# Patient Record
Sex: MALE | Race: WHITE
[De-identification: names, ages, dates, MRNs, and addresses within clinical notes are randomized per-mention and may not be internally consistent; named-entity substitution may affect disease eponyms.]

---

## 2020-04-16 ENCOUNTER — HOSPITAL ENCOUNTER (OUTPATIENT)
Dept: HOSPITAL 92 - ERS | Age: 70
Setting detail: OBSERVATION
LOS: 2 days | Discharge: HOME | End: 2020-04-18
Attending: FAMILY MEDICINE | Admitting: FAMILY MEDICINE
Payer: MEDICARE

## 2020-04-16 ENCOUNTER — HOSPITAL ENCOUNTER (EMERGENCY)
Dept: HOSPITAL 18 - NAV ERS | Age: 70
Discharge: ANOTHER HOSPITAL | End: 2020-04-16
Payer: MEDICARE

## 2020-04-16 VITALS — BODY MASS INDEX: 20.1 KG/M2

## 2020-04-16 DIAGNOSIS — D64.9: ICD-10-CM

## 2020-04-16 DIAGNOSIS — C34.11: Primary | ICD-10-CM

## 2020-04-16 DIAGNOSIS — R91.8: ICD-10-CM

## 2020-04-16 DIAGNOSIS — B19.20: ICD-10-CM

## 2020-04-16 DIAGNOSIS — B19.10: ICD-10-CM

## 2020-04-16 DIAGNOSIS — E83.52: ICD-10-CM

## 2020-04-16 DIAGNOSIS — J98.59: Primary | ICD-10-CM

## 2020-04-16 DIAGNOSIS — Z87.891: ICD-10-CM

## 2020-04-16 LAB
ALBUMIN SERPL BCG-MCNC: 4.1 G/DL (ref 3.4–4.8)
ALP SERPL-CCNC: 109 U/L (ref 40–110)
ALT SERPL W P-5'-P-CCNC: 12 U/L (ref 8–55)
ANION GAP SERPL CALC-SCNC: 14 MMOL/L (ref 10–20)
AST SERPL-CCNC: 12 U/L (ref 5–34)
BILIRUB SERPL-MCNC: 0.4 MG/DL (ref 0.2–1.2)
BUN SERPL-MCNC: 12 MG/DL (ref 8.4–25.7)
CALCIUM SERPL-MCNC: 11.6 MG/DL (ref 7.8–10.44)
CHLORIDE SERPL-SCNC: 100 MMOL/L (ref 98–107)
CO2 SERPL-SCNC: 27 MMOL/L (ref 23–31)
CREAT CL PREDICTED SERPL C-G-VRATE: 0 ML/MIN (ref 70–130)
GLOBULIN SER CALC-MCNC: 4 G/DL (ref 2.4–3.5)
GLUCOSE SERPL-MCNC: 123 MG/DL (ref 80–115)
HGB BLD-MCNC: 11.9 G/DL (ref 14–18)
MCH RBC QN AUTO: 26.1 PG (ref 27–31)
MCV RBC AUTO: 79.7 FL (ref 78–98)
MDIFF COMPLETE?: YES
PLATELET # BLD AUTO: 757 THOU/UL (ref 130–400)
POTASSIUM SERPL-SCNC: 4 MMOL/L (ref 3.5–5.1)
RBC # BLD AUTO: 4.57 MILL/UL (ref 4.7–6.1)
SODIUM SERPL-SCNC: 137 MMOL/L (ref 136–145)
WBC # BLD AUTO: 29.8 THOU/UL (ref 4.8–10.8)

## 2020-04-16 PROCEDURE — 96375 TX/PRO/DX INJ NEW DRUG ADDON: CPT

## 2020-04-16 PROCEDURE — 85027 COMPLETE CBC AUTOMATED: CPT

## 2020-04-16 PROCEDURE — 88305 TISSUE EXAM BY PATHOLOGIST: CPT

## 2020-04-16 PROCEDURE — 36415 COLL VENOUS BLD VENIPUNCTURE: CPT

## 2020-04-16 PROCEDURE — 77012 CT SCAN FOR NEEDLE BIOPSY: CPT

## 2020-04-16 PROCEDURE — A9579 GAD-BASE MR CONTRAST NOS,1ML: HCPCS

## 2020-04-16 PROCEDURE — 96361 HYDRATE IV INFUSION ADD-ON: CPT

## 2020-04-16 PROCEDURE — G0378 HOSPITAL OBSERVATION PER HR: HCPCS

## 2020-04-16 PROCEDURE — 85610 PROTHROMBIN TIME: CPT

## 2020-04-16 PROCEDURE — 0BBC3ZX EXCISION OF RIGHT UPPER LUNG LOBE, PERCUTANEOUS APPROACH, DIAGNOSTIC: ICD-10-PCS | Performed by: RADIOLOGY

## 2020-04-16 PROCEDURE — 80053 COMPREHEN METABOLIC PANEL: CPT

## 2020-04-16 PROCEDURE — 88342 IMHCHEM/IMCYTCHM 1ST ANTB: CPT

## 2020-04-16 PROCEDURE — 96376 TX/PRO/DX INJ SAME DRUG ADON: CPT

## 2020-04-16 PROCEDURE — 78306 BONE IMAGING WHOLE BODY: CPT

## 2020-04-16 PROCEDURE — 99284 EMERGENCY DEPT VISIT MOD MDM: CPT

## 2020-04-16 PROCEDURE — 80048 BASIC METABOLIC PNL TOTAL CA: CPT

## 2020-04-16 PROCEDURE — 71046 X-RAY EXAM CHEST 2 VIEWS: CPT

## 2020-04-16 PROCEDURE — 85025 COMPLETE CBC W/AUTO DIFF WBC: CPT

## 2020-04-16 PROCEDURE — 87040 BLOOD CULTURE FOR BACTERIA: CPT

## 2020-04-16 PROCEDURE — 96374 THER/PROPH/DIAG INJ IV PUSH: CPT

## 2020-04-16 PROCEDURE — 99285 EMERGENCY DEPT VISIT HI MDM: CPT

## 2020-04-16 PROCEDURE — 70553 MRI BRAIN STEM W/O & W/DYE: CPT

## 2020-04-16 PROCEDURE — 32405: CPT

## 2020-04-16 PROCEDURE — 73030 X-RAY EXAM OF SHOULDER: CPT

## 2020-04-16 PROCEDURE — 71045 X-RAY EXAM CHEST 1 VIEW: CPT

## 2020-04-16 PROCEDURE — 71270 CT THORAX DX C-/C+: CPT

## 2020-04-16 PROCEDURE — 83605 ASSAY OF LACTIC ACID: CPT

## 2020-04-16 PROCEDURE — 88333 PATH CONSLTJ SURG CYTO XM 1: CPT

## 2020-04-16 PROCEDURE — A9503 TC99M MEDRONATE: HCPCS

## 2020-04-16 PROCEDURE — 85007 BL SMEAR W/DIFF WBC COUNT: CPT

## 2020-04-16 PROCEDURE — 85730 THROMBOPLASTIN TIME PARTIAL: CPT

## 2020-04-16 PROCEDURE — 88341 IMHCHEM/IMCYTCHM EA ADD ANTB: CPT

## 2020-04-16 PROCEDURE — 74178 CT ABD&PLV WO CNTR FLWD CNTR: CPT

## 2020-04-16 PROCEDURE — 88334 PATH CONSLTJ SURG CYTO XM EA: CPT

## 2020-04-16 RX ADMIN — HYDROCODONE BITARTRATE AND ACETAMINOPHEN PRN TAB: 5; 325 TABLET ORAL at 22:40

## 2020-04-16 RX ADMIN — HYDROCODONE BITARTRATE AND ACETAMINOPHEN PRN TAB: 5; 325 TABLET ORAL at 18:47

## 2020-04-16 NOTE — HP
PRIMARY CARE PROVIDER:  [facility].



CHIEF COMPLAINT:  Chest and right upper extremity pain.



HISTORY OF PRESENT ILLNESS:  This is a 69-year-old  male, who presents to

[facility] Emergency Department complaining of progressive and severe right

shoulder, upper and chest wall pain.  The patient states he noticed pain over the

last several weeks, increasing in intensity and severity, not amenable to

over-the-counter medications.  The patient states he took an old prescription of

Norco which was prescribed to him after a hernia repair in 2019.  The patient had

some relief with Norco, but was unable to sleep lying on his back and had to sit

upright due to the pain.  The patient denied any direct trauma injury, fall, or

swelling.  The patient states he works as a  and is normally very active and

thought he might have pulled a muscle while working.  The patient admits to

unintended weight loss of 20 to 30 pounds since the late fall of 2019.  The patient

states he noticed the weight loss with associated shortness of breath, but no

hematuria, hemoptysis, or melena.  The patient denied any documented fever, chills,

or family members with similar symptoms.  The patient denied any specific travel

history or exposures.  The patient noted burning sensation in the right upper chest

and upper back, worse when lying flat or taking deep breath.  In the emergency room,

the patient underwent plain radiographs of the shoulder as well as CT of the chest

showing large tumors at the upper right chest wall, right hilar region and right

adrenal.  The patient received IV Toradol and morphine sulfate and was referred to

the Hospitalist Service for further evaluation. 



PAST MEDICAL HISTORY:  

1. Chronic hepatitis B and C without current treatment.

2. History of basal cell carcinoma.



PAST SURGICAL HISTORY:  

1. Status post inguinal hernia repair in 2019.

2. Status post multiple skin cancer removals.



CURRENT MEDICATIONS:  Norco 5/325 mg 1 to 2 tablets p.o. q.6 hours p.r.n. pain.



ALLERGIES:  NO KNOWN DRUG ALLERGIES.



FAMILY HISTORY:  Mother with history of inoperable brain cancer of unknown type.

Father with history of pancreatic cancer.  The patient's son with history of gastric

cancer. 



SOCIAL HISTORY:  The patient resides in [place], [place].  Works as a .

.  No current alcohol, tobacco, or illicit drug use.  Quit smoking in the

1970s after smoking for approximately 20 years. 



REVIEW OF SYSTEMS:  CONSTITUTIONAL:  Negative for weight loss or gain, ability to

conduct usual activities. 

SKIN:  Negative for rash, itching. 

EYES:  Negative for double vision, pain. 

ENT/MOUTH:  Negative for nose bleeding, neck stiffness, pain, tenderness. 

CARDIOVASCULAR:  Negative for palpitations, dyspnea on exertion, orthopnea. 

RESPIRATORY:  Negative for shortness of breath, wheezing, cough, hemoptysis, fever

or night sweats. 

GASTROINTESTINAL:  Negative for poor appetite, abdominal pain, heartburn, nausea,

vomiting, constipation, or diarrhea. 

GENITOURINARY:  Negative for urgency, frequency, dysuria, nocturia. 

MUSCULOSKELETAL:  Negative for pain, swelling. 

NEUROLOGIC/PSYCHIATRIC:  Negative for anxiety, depression. 

ALLERGY/IMMUNOLOGIC:  Negative for skin rash, bleeding tendency. 



Otherwise negative except as stated per HPI.



PHYSICAL EXAMINATION:

VITAL SIGNS:  On admission, blood pressure 158/98, pulse 82, respiratory rate 16,

temperature 98.2 degrees Fahrenheit, and O2 saturation 98% on room air. 

GENERAL APPEARANCE:  This is a 69-year-old  male, alert and oriented x3,

pleasant, responsive, in no acute distress. 

HEENT:  Pupils are equal, round, reactive to light and accommodation.  Extraocular

muscles are intact.  No scleral icterus.  No conjunctival injection.  Nares are

patent.  OP is clear.  Teeth in good repair. 

NECK:  Supple.  No cervical adenopathy.  No thyromegaly.  No carotid bruits.  No JVD

appreciated.  Cervical spine with full active and passive range of motion.  No

meningeal signs noted. 

CHEST:  Coarse breath sounds with diminished air flow in the right base and right

upper lobe.  Occasional expiratory wheeze in the right hemithorax. 

CARDIOVASCULAR:  S1, S2 without noted murmur, rub, or gallop. 

ABDOMEN:  Rounded, soft, nontender, and nondistended.  Bowel sounds are positive in

all 4 quadrants.  There is no hepatosplenomegaly.  No abdominal bruits.  No rebound

or guarding appreciated. 

EXTREMITIES:  Warm and dry with fair turgor.  No clubbing, cyanosis, or asymmetric

edema appreciated.  Pulses palpable distally at the dorsalis pedis, posterior

tibial, and popliteal arteries bilaterally.  Capillary refill less than 2 seconds. 

NEUROLOGIC:  Cranial nerves 2 through 12 are grossly intact.  No focal or

lateralizing signs appreciated. 

MUSCULOSKELETAL:  Positive tenderness to palpation in the right upper chest wall

anteriorly. 



PERTINENT LABORATORY AND X-RAY FINDINGS:  Sodium 137, potassium 4.0, chloride 100,

CO2 of 27, BUN 12, creatinine 0.88, estimated GFR of 86, glucose 123, lactic acid

level 0.9, calcium 11.6.  LFTs within normal limits.  Albumin 4.1.  CBC showed a

white blood cell count of 29.8, hemoglobin 12, hematocrit 36, platelet count 757

with 74% neutrophils.  Three views of the right shoulder dated 04/16/2020 showed

pleural-based large right upper lobe pulmonary mass with possible cavitation and

adjacent lesion of the right mediastinum.  No fracture of the shoulder.  CT of the

chest dated 04/16/2020 showed stage IV malignant neoplasm of the upper lobe of the

right lung with large right upper lobe lateral pleural-based necrotic mass with

direct invasion of the right second rib and right chest wall musculature.  Right

hilar malignant neoplastic tumor noted.  Right adrenal necrotic mass. 



ASSESSMENT AND PLAN:  

1. Multiple lung neoplasms.  Suspect stage IV lung carcinoma, given CT imaging.  We

will obtain CT-guided biopsy to define the pathology and for planning of treatment

options.  Consult Medical Oncology Service.  Symptomatic management with Toradol 30

mg IV q.6 hours and morphine sulfate 4 mg IV q.4 hours p.r.n.  Add DuoNeb q.4 hours. 

2. Chest wall pain.  Secondarily to #1.  Continue symptomatic and supportive

management as outlined previously. 

3. Dyspnea.  Secondarily to #1.  We will provide oxygen p.r.n. to maintain O2

saturations greater than or equal to 90%.  Bronchodilator therapy with DuoNeb. 

4. Hypercalcemia.  Secondarily to #1.  We will continue intravenous normal saline at

100 mL/h and repeat calcium level in the a.m. 

5. Normocytic anemia.  Suspect secondarily to #1.  No evidence of acute blood loss.

Serial hemoglobin and hematocrit monitoring. 

6. Prophylaxis.  SCDs while in bed.  Pepcid 20 mg p.o. b.i.d.

7. Code status is full.  Surrogate medical decision maker is the patient's spouse.







Job ID:  [id number]

## 2020-04-17 LAB
ANION GAP SERPL CALC-SCNC: 11 MMOL/L (ref 10–20)
APTT PPP: 33.8 SEC (ref 22.9–36.1)
BUN SERPL-MCNC: 15 MG/DL (ref 8.4–25.7)
CALCIUM SERPL-MCNC: 10.4 MG/DL (ref 7.8–10.44)
CHLORIDE SERPL-SCNC: 104 MMOL/L (ref 98–107)
CO2 SERPL-SCNC: 26 MMOL/L (ref 23–31)
CREAT CL PREDICTED SERPL C-G-VRATE: 78 ML/MIN (ref 70–130)
GLUCOSE SERPL-MCNC: 107 MG/DL (ref 80–115)
HGB BLD-MCNC: 10 G/DL (ref 14–18)
INR PPP: 1.1
MCH RBC QN AUTO: 26.6 PG (ref 27–31)
MCV RBC AUTO: 80.5 FL (ref 78–98)
MDIFF COMPLETE?: YES
PLATELET # BLD AUTO: 637 THOU/UL (ref 130–400)
POLYCHROMASIA BLD QL SMEAR: (no result) (100X)
POTASSIUM SERPL-SCNC: 3.9 MMOL/L (ref 3.5–5.1)
PROTHROMBIN TIME: 14.2 SEC (ref 12–14.7)
RBC # BLD AUTO: 3.77 MILL/UL (ref 4.7–6.1)
SODIUM SERPL-SCNC: 137 MMOL/L (ref 136–145)
WBC # BLD AUTO: 22.9 THOU/UL (ref 4.8–10.8)

## 2020-04-17 RX ADMIN — HYDROCODONE BITARTRATE AND ACETAMINOPHEN PRN TAB: 5; 325 TABLET ORAL at 03:03

## 2020-04-17 RX ADMIN — HYDROCODONE BITARTRATE AND ACETAMINOPHEN PRN TAB: 5; 325 TABLET ORAL at 16:12

## 2020-04-17 RX ADMIN — HYDROCODONE BITARTRATE AND ACETAMINOPHEN PRN TAB: 5; 325 TABLET ORAL at 20:18

## 2020-04-17 NOTE — NM
NUCLEAR MEDICINE WHOLE BODY SCAN:



HISTORY: 

Lung cancer. Weight loss. Staging study.



TECHNIQUE: 

Patient was administered 28.90 mCi of technetium 99m MDP intravenously. Whole body delayed imaging is
 performed.



FINDINGS:

Physiologic distribution of radiotracer.



Uptake in bilateral shoulders, bilateral wrists, bilateral knees and the right first metatarsal phala
ngeal joint on the basis of degenerative change.



There is focal radiotracer involving the anterior lateral right second rib. Uptake corresponds to kassandra
nges noted on chest CT (4/16/2018). There is bone destruction compatible with metastatic disease.

Additional metastatic lesions in the right ribs are not noted.



Uptake in the distal lumbar spine is felt to be on the basis of degenerative change given vacuum disc
 phenomenon and osteophyte formation at L4-L5 and L5-S1.



IMPRESSION:

Solitary metastatic focus involving the anterior lateral right second rib.



Transcribed Date/Time: 4/17/2020 1:48 PM



Reported By: Shara Villalobos 

Electronically Signed:  4/17/2020 2:12 PM

## 2020-04-17 NOTE — PDOC.HOSPP
- Subjective


Encounter Date: 04/17/20


Encounter Time: 16:00


Subjective: 





f/u for suspected metastatic lung carcinoma with involvement of R adrenal s/p CT

-guided bx today. Feels ok overall and awaiting MRI brain for staging purposes.





- Objective


Vital Signs & Weight: 


 Vital Signs (12 hours)











  Temp Pulse Resp BP Pulse Ox


 


 04/17/20 12:09  98 F  87  18  144/71 H  99


 


 04/17/20 08:29  97.5 F L  74  18  117/67  98








 Weight











Admit Weight                   148 lb 4.8 oz


 


Weight                         148 lb 4.8 oz














I&O: 


 











 04/16/20 04/17/20 04/18/20





 06:59 06:59 06:59


 


Intake Total  2073 120


 


Balance  2073 120











Result Diagrams: 


 04/17/20 07:08





 04/17/20 07:08


Radiology Reviewed by me: Yes (CT abd/pel - Large R adrenal mass)





Hospitalist ROS





- Medication


Medications: 


Active Medications











Generic Name Dose Route Start Last Admin





  Trade Name Freq  PRN Reason Stop Dose Admin


 


Hydrocodone Bitart/Acetaminophen  1 tab  04/16/20 17:42  04/16/20 18:47





  Norco 5/325  PO   1 tab





  Q4H PRN   Administration





  Moderate Pain (4-6)   





     





     





     


 


Hydrocodone Bitart/Acetaminophen  2 tab  04/16/20 17:42  04/17/20 03:03





  [place] 5/325  PO   2 tab





  Q4H PRN   Administration





  Severe Pain (7-10)   





     





     





     


 


Famotidine  20 mg  04/16/20 21:00  04/17/20 08:31





  Pepcid  PO   20 mg





  BID MICHAEL   Administration





     





     





     





     


 


Sodium Chloride  1,000 mls @ 100 mls/hr  04/16/20 17:45  04/17/20 04:58





  Normal Saline 0.9%  IV   1,000 mls





  .Q10H MICHAEL   Administration





     





     





     





     


 


Ketorolac Tromethamine  30 mg  04/16/20 18:00  04/17/20 11:22





  Toradol  IVP  04/21/20 18:01  30 mg





  Q6HR MICHAEL   Administration





     





     





     





     


 


Morphine Sulfate  4 mg  04/16/20 17:42  04/17/20 08:34





  Morphine  SLOW IVP   4 mg





  Q4H PRN   Administration





  Moderate to Severe Pain (6-10)   





     





     





     














- Exam


General Appearance: NAD, awake alert


Eye: PERRL, anicteric sclera


ENT: normocephalic atraumatic, no oropharyngeal lesions


Neck: supple, symmetric, no JVD, no thyromegaly


Heart: RRR, no murmur, no gallops, no rubs, normal peripheral pulses


Heart - other findings: S1, S2


Respiratory: CTAB, no wheezes, no rales, no ronchi, normal chest expansion


Gastrointestinal: soft, non-tender, non-distended, normal bowel sounds, no 

palpable masses


Extremities: no cyanosis, no clubbing, no edema


Skin: normal turgor, no lesions


Neurological: cranial nerve grossly intact, no new deficit


Musculoskeletal: normal tone, normal strength


Psychiatric: normal affect, A&O x 3





Hosp A/P


(1) Metastatic lung carcinoma


Code(s): C78.00 - SECONDARY MALIGNANT NEOPLASM OF UNSPECIFIED LUNG   Status: 

Acute   


Qualifiers: 


   Laterality: right   Qualified Code(s): C78.01 - Secondary malignant neoplasm 

of right lung   


Plan: 


Suspected and awaiting pathological confirmation, workup pending with MRI brain 

in am, planning for outpt medical oncology f/u








(2) Chest wall pain


Code(s): R07.89 - OTHER CHEST PAIN   Status: Acute   


Plan: 


Secondary to #1, pain control








(3) Dyspnea


Code(s): R06.00 - DYSPNEA, UNSPECIFIED   Status: Acute   


Plan: 


Secondary to #1, no O2 requirement currently








(4) Hypercalcemia


Code(s): E83.52 - HYPERCALCEMIA   Status: Acute   


Plan: 


Improved with IVF's, continue IVF's another 24h then d/c








- Plan


, out of bed/ambulate, DVT proph w/SCDs





Stable currently


Continue IVF's


Pain control with Norco/Morphine Sulfate PRN


MRI brain in am


Outpt follow up with Med oncology


AM lab: CBC


Likely home in am

## 2020-04-17 NOTE — CON
DATE OF CONSULTATION:  



REASON FOR CONSULTATION:  Lung mass.



HISTORY OF PRESENT ILLNESS:  Mr. Angelo is a very pleasant 69-year-old gentleman who

presented to the emergency room in [place] with a several-week history of right

shoulder blade and chest wall pain.  He thought it was due to injury from working.

The pain got significantly worse, so he presented to the ER for evaluation.  He had

a chest x-ray, which showed multiple lung nodules and underwent a chest CT scan.  CT

scan showed a 6.5 x 6.5 x 4.5 mass in the right upper lobe.  There was another mass

in the right hilum abutting the right mediastinum, measuring 6 x 3.5 x 4.5.  It was

extrinsically compressing and narrowing the superior vena cava.  He also had a 5.5 x

3.5 x 6.5 right adrenal mass.  He was transferred to this facility for further

workup.  He underwent a CT of the abdomen and pelvis, which again confirmed the

large right adrenal mass.  Bone scan showed a lesion in the right rib.  He has been

treated with Toradol and Norco for pain.  He has had a lung biopsy this morning.

This patient states he smoked briefly, but quit 45 years ago.  He has lost 15 pounds

over the last six months.  Denies any shortness of breath, although he does have

difficulty lying flat due to pain in his right shoulder.  No abdominal discomfort or

swelling. 



PAST MEDICAL HISTORY:  

1. Chronic hepatitis B and C.

2. History of basal cell carcinoma.



PAST SURGICAL HISTORY:  

1. Skin cancer removals.

2. Inguinal hernia repair.



ALLERGIES:  NO KNOWN DRUG ALLERGIES.



HOME MEDICATIONS:  

1. Norco p.r.n.

2. Milk thistle for hepatitis.



FAMILY HISTORY:  Mother had brain surgery.  Son  from gastric cancer.  Father

had pancreatic cancer. 



SOCIAL HISTORY:  , lives with his wife in [place], works as a .

Remote history of smoking and alcohol use. 



REVIEW OF SYSTEMS:  A 10-point review of systems is negative except for noted in HPI.



PHYSICAL EXAMINATION:

VITAL SIGNS:  Temperature is 98, pulse is 87, respiratory rate 18, BP is 144/71, and

he is 99% on room air. 

GENERAL:  This is a well-developed, well-nourished male, in no acute distress. 

HEENT:  Normocephalic and atraumatic.  Pupils equal and reactive to light. 

NECK:  Supple. 

CV:  Regular rate and rhythm. 

LUNGS:  Clear anterior. 

ABDOMEN:  Soft and nontender.  Bowel sounds are positive. 

EXTREMITIES:  There is no clubbing or cyanosis. 

SKIN:  No rash. 

LYMPH:  There is no palpable cervical or supraclavicular lymphadenopathy. 

NEUROLOGIC:  Nonfocal.



PERTINENT LABORATORY DATA AND X-RAYS:  Current WBCs are 22.9, hemoglobin 10,

hematocrit 30.3, and platelet count is 637,000.  He has 67% neutrophils, 10% bands,

and 7% lymphocytes.  PT is 14.2, INR is 1.1, and PTT is 33.8.  Sodium is 137,

potassium 3.9, chloride 104, CO2 is 26, BUN is 15, creatinine is 0.87, and calcium

is 10.4.  Bilirubin is 0.4, AST is 12, ALT is 12, alkaline phosphatase is 109, serum

total protein is 8.1, albumin 4.1, and globulin 4.0.  Radiology per HPI. 



ASSESSMENT:  

1. Metastatic disease, likely lung primary.

2. Extrinsic compression of a lower superior vena cava from lung mass.



DISCUSSION:  Case has been discussed with Dr. Dhillon.  The patient's biopsy has been

completed and path is currently pending.  His pain has been controlled with [place]

and Toradol.  He does need a brain MRI to complete staging.  We will order that for

today or tomorrow.  Once he is stable, he can be discharged home.  We will discuss

further with Dr. Dhillon if he needs radiation to his lung mass for palliation. 



Thank you for the consult.  We will be happy to take care of this patient.







Job ID:  [id number]

## 2020-04-17 NOTE — RAD
INSPIRATORY AND EXPIRATORY CHEST:

 

Date:  04/17/2020

 

HISTORY:  

Post lung biopsy. 

 

COMPARISON:  

04/17/2020 at 1038 hours. 

 

FINDINGS:

Large cavitary pleural based right upper lobe mass is again seen with persistent irregular-shaped mas
s in the right hilar and suprahilar region. Bullous emphysematous changes at each lung apex are again
 present. Calcified granuloma left mid lung zone is present. Left lung otherwise appears clear. No pn
eumothorax or pleural effusion is identified. 

 

IMPRESSION: 

1.  Stable chest without evidence of a pneumothorax or pleural effusion. 

2.  Right upper lobe masses persist. 

 

 

POS: [facility]

## 2020-04-17 NOTE — CT
CT abdomen and pelvis without and with IV contrast



HISTORY: Lung cancer. Adrenal mass.



FINDINGS: Small hiatal hernia. Calcified granulomata are consistent with healed granulomatous disease
. A 1.8 cm oval cyst is present within the lower anterior margin of the medial segment right liver

lobe.



Calcification throughout the arterial structures.



Hyperdense material within the dependent portion of the gallbladder lumen has the appearance of hyper
dense fluid rather than biliary sludge. Possibly related to prior iodine ingestion.



A large, somewhat irregular shaped and lobulated very heterogeneous enhancing solid mass of the right
 adrenal gland is 7.4 cm greatest length by 7.1 cm depth by 3.4 cm with. Left adrenal gland has a

normal appearance.



Urinary bladder is decompressed. Diverticula arise from the colon without adjacent inflammation. Larg
e amount stool is present within the colon.



  



IMPRESSION :

Large aggressive right adrenal mass, consistent with a metastatic lesion.



Constipation. Diverticulosis. No evidence of diverticulitis.



Atherosclerosis.



Small hiatal hernia.



Reported By: DEL Brown 

Electronically Signed:  4/17/2020 11:27 AM

## 2020-04-17 NOTE — CT
PROCEDURE:

CT Lung Perc Biopsy



PROVIDED CLINICAL HISTORY:

Cavitary right upper lobe mass with extension into the adjacent pleura. Biopsy was requested



COMPARISON:

CT thorax on 4/16/2020.



TECHNIQUE:

The procedure including the risks and complications were explained to the patient, and informed conse
nt was obtained. The patient was placed on the CT scan table in the supine position. Limited

noncontrasted CT scan was obtained through the right upper lung zones with grid localizer in place ov
erlying the right anterolateral upper chest. An area overlying the pleural-based component of the

large cavitary right upper lobe mass was marked, and the area was meticulously prepped and draped in 
usual sterile fashion.



Skin and subcutaneous tissues were infiltrated with buffered 1% lidocaine for local anesthesia. A sma
ll skin incision was made. A 17-gauge guide needle was advanced to the level of the pleural-based

component of the large right upper lobe mass. Utilizing coaxial technique, three 18-gauge core needle
 biopsy specimens were obtained. However, only necrotic material was obtained on each biopsy. The

needle was removed, and hemostasis was achieved with direct pressure.



The skin and subcutaneous tissues just medial to the original biopsy site and overlying the right upp
er lobe mass were infiltrated with buffered 1% lidocaine for local anesthesia. A small skin

incision was made. A 17-gauge guide needle was advanced followed by axial noncontrasted CT images. Th
is was repeated until the needle was placed just within the right upper lobe mass. A total of three

18-gauge core needle biopsy specimens were obtained. Pathologist was available for evaluation of the 
specimen and noted adequate cellular material. As result, no additional biopsy specimens were

obtained.



The needle was removed, and hemostasis was achieved with direct pressure. Follow-up CT scan thorax de
monstrates no pneumothorax or findings to suggest hematoma. The patient tolerated the procedure

well and without immediate complication.



IMPRESSION:



1. Technically successful biopsy of pleural-based component large right upper lobe mass. However, onl
y necrotic material and was obtained.

2. Technically successful biopsy of the cavitary large right upper lobe mass. Adequate cellular mater
ial was obtained according to pathology results. Final pathology report is pending.



Reported By: Jai Chan 

Electronically Signed:  4/17/2020 10:39 AM

## 2020-04-17 NOTE — RAD
Exam: 

INSPIRATORY CHEST RADIOGRAPH AND EXPIRATORY CHEST RADIOGRAPH:



HISTORY: Status post right upper lobe biopsy. Evaluate for pneumothorax.



FINDINGS:  Upright portable inspiratory and expiratory chest radiograph demonstrate emphysematous kassandra
nges. Right upper lobe mass is noted. No pneumothorax or acute osseous abnormalities.



IMPRESSION: No pneumothorax.



Transcribed Date/Time: 4/17/2020 10:59 AM



Reported By: Shara Villalobos 

Electronically Signed:  4/17/2020 11:48 AM

## 2020-04-18 VITALS — DIASTOLIC BLOOD PRESSURE: 74 MMHG | TEMPERATURE: 98.1 F | SYSTOLIC BLOOD PRESSURE: 136 MMHG

## 2020-04-18 LAB
HGB BLD-MCNC: 9.7 G/DL (ref 14–18)
MCH RBC QN AUTO: 26.1 PG (ref 27–31)
MCV RBC AUTO: 81 FL (ref 78–98)
MDIFF COMPLETE?: YES
PLATELET # BLD AUTO: 651 THOU/UL (ref 130–400)
RBC # BLD AUTO: 3.73 MILL/UL (ref 4.7–6.1)
WBC # BLD AUTO: 24 THOU/UL (ref 4.8–10.8)

## 2020-04-18 RX ADMIN — HYDROCODONE BITARTRATE AND ACETAMINOPHEN PRN TAB: 5; 325 TABLET ORAL at 01:45

## 2020-04-18 RX ADMIN — HYDROCODONE BITARTRATE AND ACETAMINOPHEN PRN TAB: 5; 325 TABLET ORAL at 07:04

## 2020-04-18 NOTE — MRI
BRAIN MRI WITH AND WITHOUT IV CONTRAST:

 

History: 

Stage 4 lung cancer. 

 

FINDINGS: 

There are some scattered chronic white matter ischemic changes. No mass effect or midline shift. No i
ntra or extraaxial hemorrhage. No abnormal contrast enhancement. No evidence for acute infarct. Expec
laurel flow voids are present. Mild sinus mucosal congestion. 

 

IMPRESSION: 

Mild chronic white matter ischemic changes. No evidence for metastasis or other acute process. No dorene
dence for acute infarct. 

 

POS: [facility]

## 2020-04-18 NOTE — DIS
DATE OF ADMISSION:  04/16/2020



DATE OF DISCHARGE:  04/18/2020



REASON FOR HOSPITALIZATION:  Right-sided upper chest pain.



SIGNIFICANT FINDINGS:  The patient was found to have a cavitary right upper lobe

mass with extension to the adjacent pleura in addition to solitary focus of the

anterior lateral right 2nd rib. 



PROCEDURES PERFORMED AND TREATMENTS RENDERED:  The patient was admitted to the

Oncology Unit for close management.  The patient was seen and evaluated by Oncology,

who dictated an appropriate plan of care.  Please see full consultation notes and

progress notes from oncologist for full details.  The patient underwent a biopsy of

the lungs on 04/17/2020.  Please see full operative report from Dr. Jai Chan on

04/17/2020 for details.  Biopsy reported a technically successful biopsy of

pleural-based component, right upper lobe mass, however, only necrotic material was

obtained.  Impression #2, technically successful biopsy of cavitary large right

upper lobe mass.  Adequate cellular material was obtained according to pathology

results.  Final pathology report is pending and report from Dr. Jai Chan.

Oncologist recommending that the patient was safe for discharge on 04/18/2020 with

close followup in the outpatient setting. 



CONDITION ON DISCHARGE:  Stable.



SPECIFIC INSTRUCTIONS FOR THE PATIENT/FAMILY:  

1. The patient recommended to follow up with primary care physician in the next 5 to

7 days. 

2. The patient recommended to follow up with Oncology in the next 1 to 2 weeks.

3. The patient recommended to strictly follow the recommendations of Oncology for

treatment of pulmonary lesion. 

4. The patient recommended to take all medications as directed, to be re-evaluated

by primary care physician and Oncology and adjusted as appropriate. 

5. The patient recommended to return to acute care hospital immediately if signs or

symptoms return, worsen, or any other new symptoms occur. 



TIME SPENT:  Greater than 38 minutes spent coordinating care and discharge process

for this patient. 







Job ID:  [id number]

## 2020-04-22 NOTE — CT
PROCEDURE:

CT Lung Perc Biopsy



PROVIDED CLINICAL HISTORY:

Cavitary right upper lobe mass with extension into the adjacent pleura. Biopsy was requested



COMPARISON:

CT thorax on 4/16/2020.



TECHNIQUE:

The procedure including the risks and complications were explained to the patient, and informed conse
nt was obtained. The patient was placed on the CT scan table in the supine position. Limited

noncontrasted CT scan was obtained through the right upper lung zones with grid localizer in place ov
erlying the right anterolateral upper chest. An area overlying the pleural-based component of the

large cavitary right upper lobe mass was marked, and the area was meticulously prepped and draped in 
usual sterile fashion.



Skin and subcutaneous tissues were infiltrated with buffered 1% lidocaine for local anesthesia. A sma
ll skin incision was made. A 17-gauge guide needle was advanced to the level of the pleural-based

component of the large right upper lobe mass. Utilizing coaxial technique, three 18-gauge core needle
 biopsy specimens were obtained. However, only necrotic material was obtained on each biopsy. The

needle was removed, and hemostasis was achieved with direct pressure.



The skin and subcutaneous tissues just medial to the original biopsy site and overlying the right upp
er lobe mass were infiltrated with buffered 1% lidocaine for local anesthesia. A small skin

incision was made. A 17-gauge guide needle was advanced followed by axial noncontrasted CT images. Th
is was repeated until the needle was placed just within the right upper lobe mass. A total of three

18-gauge core needle biopsy specimens were obtained. Pathologist was available for evaluation of the 
specimen and noted adequate cellular material. As result, no additional biopsy specimens were

obtained.



The needle was removed, and hemostasis was achieved with direct pressure. Follow-up CT scan thorax de
monstrates no pneumothorax or findings to suggest hematoma. The patient tolerated the procedure

well and without immediate complication.



IMPRESSION:



1. Technically successful biopsy of pleural-based component large right upper lobe mass. However, onl
y necrotic material and was obtained.

2. Technically successful biopsy of the cavitary large right upper lobe mass. Adequate cellular mater
ial was obtained according to pathology results. Final pathology report is pending.



Transcribed Date/Time: 4/22/2020 2:00 PM



Reported By: Jai Chan 

Electronically Signed:  4/22/2020 2:23 PM

## 2024-11-01 ENCOUNTER — HOSPITAL ENCOUNTER (EMERGENCY)
Dept: HOSPITAL 18 - NAV ERS | Age: 74
Discharge: HOME | End: 2024-11-01
Payer: COMMERCIAL

## 2024-11-01 DIAGNOSIS — Z87.891: ICD-10-CM
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
## 2024-11-01 DIAGNOSIS — S82.142A: Primary | ICD-10-CM
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
## 2024-11-01 LAB
ALBUMIN SERPL BCG-MCNC: 3.9 G/DL (ref 3.4–4.8)
ALP SERPL-CCNC: 84 U/L (ref 40–110)
ALT SERPL W P-5'-P-CCNC: 24 U/L (ref 8–55)
ANION GAP SERPL CALC-SCNC: 13 MMOL/L (ref 10–20)
AST SERPL-CCNC: 23 U/L (ref 5–34)
BASOPHILS # BLD AUTO: 0.1 THOU/UL (ref 0–0.2)
BASOPHILS NFR BLD AUTO: 1.1 % (ref 0–1)
BILIRUB SERPL-MCNC: 0.8 MG/DL (ref 0.2–1.2)
BUN SERPL-MCNC: 12 MG/DL (ref 8.4–25.7)
CALCIUM SERPL-MCNC: 9.6 MG/DL (ref 7.8–10.44)
CHLORIDE SERPL-SCNC: 99 MMOL/L (ref 98–107)
CO2 SERPL-SCNC: 26 MMOL/L (ref 23–31)
CREAT CL PREDICTED SERPL C-G-VRATE: 0 ML/MIN (ref 70–130)
EOSINOPHIL # BLD AUTO: 0.1 THOU/UL (ref 0–0.7)
EOSINOPHIL NFR BLD AUTO: 1.7 % (ref 0–10)
GLOBULIN SER CALC-MCNC: 3.4 G/DL (ref 2.4–3.5)
GLUCOSE SERPL-MCNC: 151 MG/DL (ref 83–110)
HCT VFR BLD CALC: 39.9 % (ref 42–52)
HGB BLD-MCNC: 12.5 G/DL (ref 14–18)
LYMPHOCYTES # BLD AUTO: 2 THOU/UL (ref 1.2–3.4)
LYMPHOCYTES NFR BLD AUTO: 23.9 % (ref 21–51)
MCH RBC QN AUTO: 25.5 PG (ref 27–31)
MCV RBC AUTO: 81.3 FL (ref 78–98)
MONOCYTES # BLD AUTO: 0.6 THOU/UL (ref 0.11–0.59)
MONOCYTES NFR BLD AUTO: 7.2 % (ref 0–10)
NEUTROPHILS # BLD AUTO: 5.6 THOU/UL (ref 1.4–6.5)
NEUTROPHILS NFR BLD AUTO: 66.1 % (ref 42–75)
PLATELET # BLD AUTO: 488 10X3/UL (ref 130–400)
POTASSIUM SERPL-SCNC: 3.6 MMOL/L (ref 3.5–5.1)
RBC # BLD AUTO: 4.91 MILL/UL (ref 4.7–6.1)
SODIUM SERPL-SCNC: 134 MMOL/L (ref 136–145)
WBC # BLD AUTO: 8.4 10X3/UL (ref 4.8–10.8)

## 2024-11-01 PROCEDURE — 80053 COMPREHEN METABOLIC PANEL: CPT

## 2024-11-01 PROCEDURE — 85025 COMPLETE CBC W/AUTO DIFF WBC: CPT

## 2024-11-01 PROCEDURE — 96374 THER/PROPH/DIAG INJ IV PUSH: CPT
